# Patient Record
Sex: MALE | Race: WHITE | Employment: FULL TIME | URBAN - METROPOLITAN AREA
[De-identification: names, ages, dates, MRNs, and addresses within clinical notes are randomized per-mention and may not be internally consistent; named-entity substitution may affect disease eponyms.]

---

## 2017-12-06 ENCOUNTER — HOSPITAL ENCOUNTER (EMERGENCY)
Age: 47
Discharge: HOME | End: 2017-12-07
Attending: EMERGENCY MEDICINE
Payer: COMMERCIAL

## 2017-12-06 DIAGNOSIS — S93.602A SPRAIN OF LEFT FOOT, INITIAL ENCOUNTER: Primary | ICD-10-CM

## 2017-12-06 PROCEDURE — 99283 EMERGENCY DEPT VISIT LOW MDM: CPT

## 2017-12-06 RX ORDER — IBUPROFEN 800 MG/1
800 TABLET ORAL ONCE
Status: COMPLETED | OUTPATIENT
Start: 2017-12-07 | End: 2017-12-07

## 2017-12-06 ASSESSMENT — PAIN DESCRIPTION - ORIENTATION: ORIENTATION: LEFT

## 2017-12-06 ASSESSMENT — PAIN DESCRIPTION - DESCRIPTORS: DESCRIPTORS: ACHING

## 2017-12-06 ASSESSMENT — PAIN DESCRIPTION - PAIN TYPE: TYPE: ACUTE PAIN

## 2017-12-06 ASSESSMENT — PAIN SCALES - GENERAL: PAINLEVEL_OUTOF10: 3

## 2017-12-06 ASSESSMENT — PAIN DESCRIPTION - LOCATION: LOCATION: FOOT

## 2017-12-07 ENCOUNTER — APPOINTMENT (OUTPATIENT)
Dept: GENERAL RADIOLOGY | Age: 47
End: 2017-12-07
Payer: COMMERCIAL

## 2017-12-07 VITALS
OXYGEN SATURATION: 97 % | HEIGHT: 73 IN | WEIGHT: 205.03 LBS | SYSTOLIC BLOOD PRESSURE: 151 MMHG | BODY MASS INDEX: 27.17 KG/M2 | TEMPERATURE: 97.9 F | RESPIRATION RATE: 14 BRPM | HEART RATE: 80 BPM | DIASTOLIC BLOOD PRESSURE: 102 MMHG

## 2017-12-07 PROCEDURE — 6370000000 HC RX 637 (ALT 250 FOR IP): Performed by: EMERGENCY MEDICINE

## 2017-12-07 PROCEDURE — 73630 X-RAY EXAM OF FOOT: CPT

## 2017-12-07 RX ADMIN — IBUPROFEN 800 MG: 800 TABLET, FILM COATED ORAL at 00:08

## 2017-12-07 ASSESSMENT — ENCOUNTER SYMPTOMS
ABDOMINAL PAIN: 0
VOMITING: 0
SHORTNESS OF BREATH: 0
NAUSEA: 0
SORE THROAT: 0
DIARRHEA: 0

## 2017-12-07 ASSESSMENT — PAIN SCALES - GENERAL: PAINLEVEL_OUTOF10: 0

## 2017-12-07 ASSESSMENT — PAIN DESCRIPTION - PROGRESSION: CLINICAL_PROGRESSION: RESOLVED
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
## 2017-12-07 NOTE — ED PROVIDER NOTES
and weight is 93 kg (205 lb 0.4 oz). His oral temperature is 97.9 °F (36.6 °C). His blood pressure is 151/102 (abnormal) and his pulse is 80. His respiration is 14 and oxygen saturation is 97%. Physical Exam   Constitutional: He is well-developed, well-nourished, and in no distress. Non-toxic appearance. He does not have a sickly appearance. No distress. HENT:   Head: Normocephalic and atraumatic. Right Ear: External ear normal.   Left Ear: External ear normal.   Nose: Nose normal.   Mouth/Throat: Oropharynx is clear and moist.   Eyes: Conjunctivae and EOM are normal. Right eye exhibits no discharge. Left eye exhibits no discharge. No scleral icterus. Neck: Normal range of motion. Neck supple. Cardiovascular: Normal rate, regular rhythm, normal heart sounds and intact distal pulses. Pulmonary/Chest: Effort normal and breath sounds normal. He has no wheezes. Abdominal: Soft. Bowel sounds are normal. There is no tenderness. There is no guarding. Musculoskeletal: Normal range of motion. Neck and back exam normal.the left foot has no significant edema. No skin lesions or erythema. No deformities. Normal distal pulses, motor and sensation. There is minimal tenderness just proximal to the base of the fifth metatarsal.  Also to the base of the first metatarsal.  Rest of foot exam is unremarkable. There is no tenderness to the ankle. Tibia/fibula is normal.  Knee and rest of lower extremity is normal.  No other abnormal musculoskeletal findings. Neurological: He is alert. GCS score is 15. Skin: Skin is warm and dry. He is not diaphoretic. Psychiatric: Affect normal.   Vitals reviewed. DIFFERENTIAL DIAGNOSIS/ MDM:     Plan Will be to image the foot. Fracture versus sprain. We'll also give analgesics.     DIAGNOSTIC RESULTS     EKG: All EKG's are interpreted by the Emergency Department Physician who either signs or Co-signs this chart in the absence of a cardiologist.        RADIOLOGY:   I directly visualized the following  images and reviewed the radiologist interpretations:  XR FOOT LEFT (MIN 3 VIEWS)   Preliminary Result   On the lateral projection, there is a tiny nonspecific calcification   projecting over the dorsal talus. Otherwise no acute fracture or subluxation identified. No results found. LABS:  No results found for this visit on 12/06/17. EMERGENCY DEPARTMENT COURSE:     The patient was given the following medications:  Orders Placed This Encounter   Medications    ibuprofen (ADVIL;MOTRIN) tablet 800 mg        Vitals:    Vitals:    12/06/17 2339 12/06/17 2341   BP: (!) 142/98 (!) 151/102   Pulse: 80    Resp: 14    Temp: 97.9 °F (36.6 °C)    TempSrc: Oral    SpO2: 97%    Weight: 93 kg (205 lb 0.4 oz)    Height: 6' 1\" (1.854 m)      -------------------------  BP: (!) 151/102, Temp: 97.9 °F (36.6 °C), Pulse: 80, Resp: 14      Re-evaluation Notes    Doing well on re-eval.  No obvious fx. Told him about his calcification. Will refer him to Dr. Chiara Villalta. Advised to return if worse. CONSULTS:    None    CRITICAL CARE:     None    PROCEDURES:    None    FINAL IMPRESSION      1. Sprain of left foot, initial encounter          DISPOSITION/PLAN   DISPOSITION Decision to Discharge    Condition on Disposition    Improved    PATIENT REFERRED TO:  No follow-up provider specified.     DISCHARGE MEDICATIONS:  New Prescriptions    No medications on file       (Please note that portions of this note were completed with a voice recognition program.  Efforts were made to edit the dictations but occasionally words are mis-transcribed.)    Nirmala Johnson DO  Attending Emergency Physician           Nirmala Johnson DO  12/07/17 9025